# Patient Record
Sex: MALE | Race: WHITE | NOT HISPANIC OR LATINO | Employment: FULL TIME | URBAN - METROPOLITAN AREA
[De-identification: names, ages, dates, MRNs, and addresses within clinical notes are randomized per-mention and may not be internally consistent; named-entity substitution may affect disease eponyms.]

---

## 2020-10-25 ENCOUNTER — OFFICE VISIT (OUTPATIENT)
Dept: URGENT CARE | Facility: CLINIC | Age: 40
End: 2020-10-25
Payer: COMMERCIAL

## 2020-10-25 VITALS
WEIGHT: 270 LBS | SYSTOLIC BLOOD PRESSURE: 160 MMHG | BODY MASS INDEX: 39.99 KG/M2 | HEIGHT: 69 IN | TEMPERATURE: 99 F | DIASTOLIC BLOOD PRESSURE: 100 MMHG | OXYGEN SATURATION: 96 % | HEART RATE: 75 BPM | RESPIRATION RATE: 16 BRPM

## 2020-10-25 DIAGNOSIS — R03.0 ELEVATED BLOOD PRESSURE READING: ICD-10-CM

## 2020-10-25 DIAGNOSIS — L03.032 PARONYCHIA OF GREAT TOE, LEFT: Primary | ICD-10-CM

## 2020-10-25 DIAGNOSIS — L60.0 INGROWN TOENAIL OF BOTH FEET: ICD-10-CM

## 2020-10-25 PROCEDURE — 99203 PR OFFICE/OUTPT VISIT, NEW, LEVL III, 30-44 MIN: ICD-10-PCS | Mod: S$GLB,,, | Performed by: NURSE PRACTITIONER

## 2020-10-25 PROCEDURE — 99203 OFFICE O/P NEW LOW 30 MIN: CPT | Mod: S$GLB,,, | Performed by: NURSE PRACTITIONER

## 2020-10-25 RX ORDER — SULFAMETHOXAZOLE AND TRIMETHOPRIM 800; 160 MG/1; MG/1
1 TABLET ORAL 2 TIMES DAILY
Qty: 20 TABLET | Refills: 0 | Status: SHIPPED | OUTPATIENT
Start: 2020-10-25 | End: 2020-11-04

## 2020-10-25 RX ORDER — MUPIROCIN 20 MG/G
OINTMENT TOPICAL
Qty: 22 G | Refills: 1 | Status: SHIPPED | OUTPATIENT
Start: 2020-10-25

## 2020-10-25 NOTE — PATIENT INSTRUCTIONS
If your condition worsens or fails to improve we recommend that you receive another evaluation at the ER immediately or contact your PCP to discuss your concerns or return here. You must understand that you've received an urgent care treatment only and that you may be released before all your medical problems are known or treated. You the patient will arrange for follouwp care as instructed.  A referral was placed for you, call [phone] to schedule appointment.  Use warm compresses epsom salt soaks for 20 minutes 3-5 times a day. Avoid picking or manipulating the wound. Clean the wound twice a day and put the antibiotic ointment on it. You should seek ER treatment if fever, increase pain, swelling or other signs of increasing infection.   If you are female and on BCP use additional methods to prevent pregnancy while on antibiotics and for one cycle after.   If you were given narcotics do not drive or operate heavy equipment or machinery while taking these medications.   Tylenol or ibuprofen can also be used as directed for pain unless you have an allergy to them or medical condition such as stomach ulcers, kidney or liver disease or blood thinners etc for which you should not be taking these type of medications.     Elevated Blood Pressure in Office Setting    - Your blood pressure was elevated during your visit to the urgent care. It was not so high that immediate care was needed but it is recommended that you monitor your blood pressure over the next week or two to make sure that it is not staying elevated.    - Please have your blood pressure taken 2-3 times daily at different times of the day.  - Write all of those blood pressures down and record the time that they were taken.   - Keep all that information and take it with you to see your Primary Care Physician.   - According to ACEP guidelines, workup and treatment of hypertension in asymptomatic patient is not warranted:  (1) Initiating treatment for  asymptomatic hypertension in the ED is not necessary when patients have follow-up.  (2) Rapidly lowering blood pressure in asymptomatic patients in the ED is unnecessary and may be harmful in some patients  (3) When ED treatment for asymptomatic hypertension is initiated, blood pressure management should attempt to gradually lower blood pressure and should not be expected to be normalized during the initial ED visit.       Ingrown Toenail, Infected (Antibiotics, No Excision)  An ingrown toenail occurs when the nail grows sideways into the skin alongside the nail. This can cause pain. It can also lead to an infection with redness, swelling, and sometimes drainage.  The most common cause of an ingrown toenail is trimming your nails wrong. Most people trim the nails too close to the skin and try to round the nail too tightly around the shape of the toe. When you do this, the nail can grow into the skin of your toe. It is safer to trim the nail ending in a straight line rather than a curve.  Other causes include injury or wearing shoes that are too short or tight. This can cause the same problem that happens when trimming your nails. Your genetics can also make this more likely to happen.  The following are the most common symptoms of an ingrown toenail:   · Pain  · Redness  · Swelling  · Drainage  If the infection is mild, you may be able to take care of it at home with the following measures:  · Frequent warm water soaks  · Keeping it clean  · Wearing loose, comfortable shoes or sandals  Another method involves using a small piece of cotton or waxed dental floss to gently lift up the corner of the problem nail. Change the cotton or floss frequently, especially if it gets dirty.  If your infection is mild, and the above methods arent working, or if the infection gets worse, see your healthcare provider. Signs of worsening infection include:  · Swelling  · Redness  · Pus drainage  In some cases, you may need  antibiotics along with warm soaks. If after 2 to 3 days of antibiotics the toenail doesn't get better or gets worse, part of the nail may need to be removed to drain the infection. With treatment, it can take 1 to 2 weeks to clear up completely.  Home care  Wound care  For the next 3 days, soak and clean your toe in warm water a few times a day.  · Twice a day for the first 3 days, clean and soak the toe as follows:  1. Soak your foot in a tub of warm water for 5 minutes. Or, hold your toe under a faucet of warm running water for 5 minute  2. Clean any remaining crust away with soap and water using a cotton swab.  3. Put a small amount of antibiotic ointment on the infected area.  · Change the dressing or bandage every time you soak or clean it, or whenever it becomes wet or dirty.  · If you were prescribed antibiotics, take them as directed until they are all gone.  · Wear comfortable shoes with a lot of toe room, or open-toe sandals, while your toe is healing.  Medicines  · You can take over-the-counter medicine for pain, unless you were given a different pain medicine to use. Note: Talk with your provider before using these medicines if you have chronic liver or kidney disease, ever had a stomach ulcer or GI (gastrointestinal) bleeding, or are taking blood thinner medicines.  · If you were given antibiotics, take them until they are used up or your provider tells you to stop, even if the wound looks better. This ensures that the infection clears up.  Prevention  To prevent ingrown toenails:  · Wear shoes that fit well. Avoid shoes that pinch the toes together.  · When you trim your toenails, do not cut them too short. Cut straight across at the top and dont round the edges.  · Dont use a sharp object to clean under your nail since this might cause an infection.  · If the toenail starts to grow into the skin again, put a small piece of waxed dental floss or cotton under that side of the nail to help it grow out  straight.  Follow-up care  Follow up with your healthcare provider, or as advised.  When to seek medical advice  Call your healthcare provider right away if any of the following occur:  · Increasing redness, pain, or swelling of the toe  · Red streaks in the skin leading away from the wound  · Pus or fluid drainage  · Fever of 100.4°F (38°C) or higher, or as directed by your provider  Date Last Reviewed: 12/1/2016  © 2639-7946 [employer]. [address] [place] [address]. All rights reserved. This information is not intended as a substitute for professional medical care. Always follow your healthcare professional's instructions.

## 2020-10-25 NOTE — PROGRESS NOTES
"Subjective:       Patient ID: Karthik Power is a 40 y.o. male.    Vitals:  height is 5' 9" (1.753 m) and weight is 122.5 kg (270 lb). His temperature is 98.5 °F (36.9 °C). His blood pressure is 160/100 (abnormal) and his pulse is 75. His respiration is 16 and oxygen saturation is 96%.     Chief Complaint: Ingrown Toenail (Left 1st Toe)    C/o ingrown toenails on both feet.  States that they have been bothering him for a little while but his schedule in Monday through Saturday and would like to get them removed.  A referral to podiatry was placed.  Left great toe is starting to look infected.  Will cover with antibiotics.  Discussed high blood pressure reading in clinic.  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      Ingrown Toenail  This is a new problem. Episode onset: 2 weeks. The problem occurs intermittently. The problem has been gradually worsening. Pertinent negatives include no abdominal pain, anorexia, arthralgias, change in bowel habit, chest pain, chills, congestion, coughing, diaphoresis, fatigue, fever, headaches, joint swelling, myalgias, nausea, neck pain, numbness, rash, sore throat, swollen glands, urinary symptoms, vertigo, visual change, vomiting or weakness. The symptoms are aggravated by walking (Palpation). Treatments tried: soaks. The treatment provided no relief.       Constitution: Negative for chills, sweating, fatigue and fever.   HENT: Negative for congestion and sore throat.    Neck: Negative for neck pain and painful lymph nodes.   Cardiovascular: Negative for chest pain and leg swelling.   Eyes: Negative for double vision and blurred vision.   Respiratory: Negative for cough and shortness of breath.    Gastrointestinal: Negative for abdominal pain, nausea, vomiting and diarrhea.   Genitourinary: Negative for dysuria, frequency and urgency.   Musculoskeletal: Negative for joint pain, joint swelling, muscle cramps and muscle ache. "   Skin: Positive for color change, erythema and abscess. Negative for pale and rash.   Allergic/Immunologic: Negative for seasonal allergies.   Neurological: Negative for dizziness, history of vertigo, light-headedness, passing out, headaches and numbness.   Hematologic/Lymphatic: Negative for swollen lymph nodes, easy bruising/bleeding and history of blood clots. Does not bruise/bleed easily.   Psychiatric/Behavioral: Negative for nervous/anxious, sleep disturbance and depression. The patient is not nervous/anxious.        Objective:      Physical Exam   Constitutional: He is oriented to person, place, and time. He appears well-developed.  Non-toxic appearance. He does not appear ill. No distress.   HENT:   Head: Normocephalic and atraumatic. Head is without abrasion, without contusion and without laceration.   Ears:   Right Ear: External ear normal.   Left Ear: External ear normal.   Nose: Nose normal.   Mouth/Throat: Oropharynx is clear and moist and mucous membranes are normal.   Eyes: Pupils are equal, round, and reactive to light. Conjunctivae, EOM and lids are normal.   Neck: Trachea normal, full passive range of motion without pain and phonation normal. Neck supple.   Cardiovascular: Normal rate, regular rhythm and normal heart sounds.   Pulmonary/Chest: Effort normal and breath sounds normal. No stridor. No respiratory distress.   Musculoskeletal: Normal range of motion.   Neurological: He is alert and oriented to person, place, and time.   Skin: Skin is warm, dry, intact, not diaphoretic, no rash and abscessed (there is an early abscess to lateral nail fold of left great toe with induration and no fluctuance with ingrown toenail.  There is a non infected ingrown toenail to right great toe as well.  ). Capillary refill takes less than 2 seconds. Lesions:  erythemaabrasion, burn, bruising and ecchymosisPsychiatric: His speech is normal and behavior is normal. Judgment and thought content normal.   Nursing  note and vitals reviewed.        Assessment:       1. Paronychia of great toe, left    2. Elevated blood pressure reading    3. Ingrown toenail of both feet        Plan:         Paronychia of great toe, left  -     Ambulatory referral/consult to Podiatry  -     sulfamethoxazole-trimethoprim 800-160mg (BACTRIM DS) 800-160 mg Tab; Take 1 tablet by mouth 2 (two) times daily. for 10 days  Dispense: 20 tablet; Refill: 0  -     mupirocin (BACTROBAN) 2 % ointment; Apply to affected area 3 times daily  Dispense: 22 g; Refill: 1    Elevated blood pressure reading    Ingrown toenail of both feet  -     Ambulatory referral/consult to Podiatry      Patient Instructions     If your condition worsens or fails to improve we recommend that you receive another evaluation at the ER immediately or contact your PCP to discuss your concerns or return here. You must understand that you've received an urgent care treatment only and that you may be released before all your medical problems are known or treated. You the patient will arrange for follouwp care as instructed.  A referral was placed for you, call [phone] to schedule appointment.  Use warm compresses epsom salt soaks for 20 minutes 3-5 times a day. Avoid picking or manipulating the wound. Clean the wound twice a day and put the antibiotic ointment on it. You should seek ER treatment if fever, increase pain, swelling or other signs of increasing infection.   If you are female and on BCP use additional methods to prevent pregnancy while on antibiotics and for one cycle after.   If you were given narcotics do not drive or operate heavy equipment or machinery while taking these medications.   Tylenol or ibuprofen can also be used as directed for pain unless you have an allergy to them or medical condition such as stomach ulcers, kidney or liver disease or blood thinners etc for which you should not be taking these type of medications.     Elevated Blood Pressure in Office  Setting    - Your blood pressure was elevated during your visit to the urgent care. It was not so high that immediate care was needed but it is recommended that you monitor your blood pressure over the next week or two to make sure that it is not staying elevated.    - Please have your blood pressure taken 2-3 times daily at different times of the day.  - Write all of those blood pressures down and record the time that they were taken.   - Keep all that information and take it with you to see your Primary Care Physician.   - According to ACEP guidelines, workup and treatment of hypertension in asymptomatic patient is not warranted:  (1) Initiating treatment for asymptomatic hypertension in the ED is not necessary when patients have follow-up.  (2) Rapidly lowering blood pressure in asymptomatic patients in the ED is unnecessary and may be harmful in some patients  (3) When ED treatment for asymptomatic hypertension is initiated, blood pressure management should attempt to gradually lower blood pressure and should not be expected to be normalized during the initial ED visit.       Ingrown Toenail, Infected (Antibiotics, No Excision)  An ingrown toenail occurs when the nail grows sideways into the skin alongside the nail. This can cause pain. It can also lead to an infection with redness, swelling, and sometimes drainage.  The most common cause of an ingrown toenail is trimming your nails wrong. Most people trim the nails too close to the skin and try to round the nail too tightly around the shape of the toe. When you do this, the nail can grow into the skin of your toe. It is safer to trim the nail ending in a straight line rather than a curve.  Other causes include injury or wearing shoes that are too short or tight. This can cause the same problem that happens when trimming your nails. Your genetics can also make this more likely to happen.  The following are the most common symptoms of an ingrown  toenail:   · Pain  · Redness  · Swelling  · Drainage  If the infection is mild, you may be able to take care of it at home with the following measures:  · Frequent warm water soaks  · Keeping it clean  · Wearing loose, comfortable shoes or sandals  Another method involves using a small piece of cotton or waxed dental floss to gently lift up the corner of the problem nail. Change the cotton or floss frequently, especially if it gets dirty.  If your infection is mild, and the above methods arent working, or if the infection gets worse, see your healthcare provider. Signs of worsening infection include:  · Swelling  · Redness  · Pus drainage  In some cases, you may need antibiotics along with warm soaks. If after 2 to 3 days of antibiotics the toenail doesn't get better or gets worse, part of the nail may need to be removed to drain the infection. With treatment, it can take 1 to 2 weeks to clear up completely.  Home care  Wound care  For the next 3 days, soak and clean your toe in warm water a few times a day.  · Twice a day for the first 3 days, clean and soak the toe as follows:  1. Soak your foot in a tub of warm water for 5 minutes. Or, hold your toe under a faucet of warm running water for 5 minute  2. Clean any remaining crust away with soap and water using a cotton swab.  3. Put a small amount of antibiotic ointment on the infected area.  · Change the dressing or bandage every time you soak or clean it, or whenever it becomes wet or dirty.  · If you were prescribed antibiotics, take them as directed until they are all gone.  · Wear comfortable shoes with a lot of toe room, or open-toe sandals, while your toe is healing.  Medicines  · You can take over-the-counter medicine for pain, unless you were given a different pain medicine to use. Note: Talk with your provider before using these medicines if you have chronic liver or kidney disease, ever had a stomach ulcer or GI (gastrointestinal) bleeding, or are  taking blood thinner medicines.  · If you were given antibiotics, take them until they are used up or your provider tells you to stop, even if the wound looks better. This ensures that the infection clears up.  Prevention  To prevent ingrown toenails:  · Wear shoes that fit well. Avoid shoes that pinch the toes together.  · When you trim your toenails, do not cut them too short. Cut straight across at the top and dont round the edges.  · Dont use a sharp object to clean under your nail since this might cause an infection.  · If the toenail starts to grow into the skin again, put a small piece of waxed dental floss or cotton under that side of the nail to help it grow out straight.  Follow-up care  Follow up with your healthcare provider, or as advised.  When to seek medical advice  Call your healthcare provider right away if any of the following occur:  · Increasing redness, pain, or swelling of the toe  · Red streaks in the skin leading away from the wound  · Pus or fluid drainage  · Fever of 100.4°F (38°C) or higher, or as directed by your provider  Date Last Reviewed: 12/1/2016  © 7692-7409 [employer]. [address] [place] [address], [place] [address]. All rights reserved. This information is not intended as a substitute for professional medical care. Always follow your healthcare professional's instructions.

## 2020-10-26 ENCOUNTER — TELEPHONE (OUTPATIENT)
Dept: URGENT CARE | Facility: CLINIC | Age: 40
End: 2020-10-26

## 2021-12-22 ENCOUNTER — IMMUNIZATION (OUTPATIENT)
Dept: INTERNAL MEDICINE | Facility: CLINIC | Age: 41
End: 2021-12-22
Payer: COMMERCIAL

## 2021-12-22 DIAGNOSIS — Z23 NEED FOR VACCINATION: Primary | ICD-10-CM

## 2021-12-22 PROCEDURE — 0004A COVID-19, MRNA, LNP-S, PF, 30 MCG/0.3 ML DOSE VACCINE: CPT | Mod: PBBFAC | Performed by: INTERNAL MEDICINE

## 2024-07-18 ENCOUNTER — LAB VISIT (OUTPATIENT)
Dept: LAB | Facility: HOSPITAL | Age: 44
End: 2024-07-18
Attending: INTERNAL MEDICINE
Payer: COMMERCIAL

## 2024-07-18 DIAGNOSIS — E11.9 TYPE 2 DIABETES MELLITUS WITHOUT COMPLICATION, WITHOUT LONG-TERM CURRENT USE OF INSULIN: ICD-10-CM

## 2024-07-18 PROBLEM — I10 ESSENTIAL HYPERTENSION: Status: ACTIVE | Noted: 2024-07-18

## 2024-07-18 LAB
ALBUMIN/CREAT UR: 9.4 UG/MG (ref 0–30)
CREAT UR-MCNC: 299 MG/DL (ref 23–375)
MICROALBUMIN UR DL<=1MG/L-MCNC: 28 UG/ML

## 2024-07-18 PROCEDURE — 82570 ASSAY OF URINE CREATININE: CPT | Performed by: INTERNAL MEDICINE

## 2024-12-04 PROBLEM — R06.83 SNORING: Status: ACTIVE | Noted: 2024-12-04

## 2025-06-19 ENCOUNTER — OFFICE VISIT (OUTPATIENT)
Dept: URGENT CARE | Facility: CLINIC | Age: 45
End: 2025-06-19
Payer: COMMERCIAL

## 2025-06-19 VITALS
HEART RATE: 86 BPM | HEIGHT: 69 IN | RESPIRATION RATE: 17 BRPM | BODY MASS INDEX: 39.99 KG/M2 | WEIGHT: 270 LBS | SYSTOLIC BLOOD PRESSURE: 161 MMHG | OXYGEN SATURATION: 97 % | DIASTOLIC BLOOD PRESSURE: 112 MMHG

## 2025-06-19 DIAGNOSIS — S46.819A STRAIN OF DELTOID MUSCLE, INITIAL ENCOUNTER: ICD-10-CM

## 2025-06-19 DIAGNOSIS — Z02.6 ENCOUNTER RELATED TO WORKER'S COMPENSATION CLAIM: ICD-10-CM

## 2025-06-19 DIAGNOSIS — S46.012A ROTATOR CUFF STRAIN, LEFT, INITIAL ENCOUNTER: Primary | ICD-10-CM

## 2025-06-19 NOTE — LETTER
[facility] [place] [address]  Phone: [phone]  Fax: [phone]  [facility]: [phone]    Pt Name: Karthik Power  Injury Date: 06/18/2025   Employee ID: [id number] Date of First Treatment: 06/19/2025   Company: [employer]      Appointment Time: 12:10 PM Arrived: 12:14 PM   Provider: Zeynep Niño MD Time Out:1:40 PM     Office Treatment:   1. Rotator cuff strain, left, initial encounter    2. Strain of deltoid muscle, initial encounter    3. Encounter related to worker's compensation claim          Patient Instructions: Attention not to aggravate affected area (Take OTC ibuprofen every 6-8hours as needed for pain)        Restrictions: No lifting/pushing/pulling more than 10 lbs, No above the shoulder/overhead work     Return Appointment: 6/26/2025 at 10:00 AM    KW

## 2025-06-19 NOTE — PROGRESS NOTES
Subjective:      Patient ID: Karthik Power is a 45 y.o. male.    Chief Complaint: Shoulder Injury    Patient's place of employment - [employer]  Patient's job title -   Date of injury - 06/18/2025  Body part injured including left or right - bilateral shoulder/arms  Injury Mechanism - pulling packages  What they were doing when they got hurt - Pt states that he was pulling a tote with packages to the front of his work van.   What they did immediately after - Stopped and called supervisor  Pain scale right now - 3/10 right now 7/10 with certain positions.    KW    See MA note above. Begin MD note:  Karthik Howard [facility] for his 1st evaluation of bilateral shoulder pain that began yesterday while pulling a tote in his work van.  He says the toe was at approximately shoulder height and weight approximately 50 lbs, which is in the normal range of packages that he lifts and carries every day.  He says as he was pulling this particular tote toward him with both arms, he felt a popping and grinding sensation in both shoulders. Th pain gradually increased throughout the day as he continue working.. Reported to supervisor and went home early.  Upon arriving home, he states he did not try any oral or topical medications for his pain.  He took a shower and tried to get some sleep.  However, he notes that he did continued to have pain with movements while sleeping.  He denies pain prior to going to work yesterday.  Denies prior injuries or surgeries to either upper extremity.  Pain overall unchanged from yesterday.  Denies numbness, tingling, radiation of pain, bruising, swelling, or redness.  No other reported injuries, complaints, or concerns at this time.      Musculoskeletal:  Positive for pain and abnormal ROM of joint.   Skin:  Negative for erythema.     Objective:     Physical Exam  Vitals and nursing note reviewed.   Constitutional:       General:  He is not in acute distress.     Appearance: Normal appearance. He is not ill-appearing.   HENT:      Head: Normocephalic.   Eyes:      Conjunctiva/sclera: Conjunctivae normal.   Pulmonary:      Effort: No respiratory distress.   Musculoskeletal:      Comments: Right shoulder:  Tender with palpation lateral deltoid muscle proximally and over AC joint.  No other reported tenderness.  Negative empty can testing, negative speed's negative belly press test.  Abduction to 160°, forward flexion to 160°, normal internal and external rotation.      Left shoulder:  Tender with palpation of posterior lateral shoulder proximally over humeral head.  This is also the site where pain localizes with provocative maneuvers and range of motion testing.  Positive empty can testing, negative Speed's test, Positive belly press test.  Forward flexion to 150°, abduction to 120°, increased pain noted with internal rotation greater than with external rotation   Skin:     General: Skin is warm and dry.      Findings: No erythema.   Neurological:      Mental Status: He is alert and oriented to person, place, and time.      GCS: GCS eye subscore is 4. GCS verbal subscore is 5. GCS motor subscore is 6.   Psychiatric:         Attention and Perception: Attention normal.         Mood and Affect: Mood normal.         Behavior: Behavior normal.      Assessment:      1. Rotator cuff strain, left, initial encounter    2. Strain of deltoid muscle, initial encounter    3. Encounter related to worker's compensation claim      Plan:     Patient presents with symptoms of a mild deltoid strain on the right and suspected rotator cuff strain on the left.  Reviewed relative lack of indication for imaging at this time with the patient as I have little to no suspicion of any acute bony abnormalities.  Patient verbalized agreement and understood that imaging may be ordered in the future if indicated.  Discussed pathogenesis of his diagnoses and recommended  supportive care.  Advised use of OTC ibuprofen 600-800 mg every 6-8 hours as needed for pain, respectively, gentle stretching with handout provided, and 1 week of limitations at work.  Indications for sooner follow-up discussed.  All patient questions and concerns addressed prior to completion of the visit.       Patient Instructions: Attention not to aggravate affected area (Take OTC ibuprofen every 6-8hours as needed for pain)   Restrictions: No lifting/pushing/pulling more than 10 lbs, No above the shoulder/overhead work  Follow up in about 1 week (around 6/26/2025).    I spent a total of 52 minutes on the day of the visit.   This includes face to face time and non-face to face time preparing to see the patient (eg, review of tests, prior records/notes), obtaining and/or reviewing separately obtained history, documenting clinical information in the electronic or other health record.

## 2025-07-01 ENCOUNTER — TELEPHONE (OUTPATIENT)
Dept: URGENT CARE | Facility: CLINIC | Age: 45
End: 2025-07-01
Payer: COMMERCIAL

## 2025-07-01 NOTE — TELEPHONE ENCOUNTER
Called the patient regarding their missed Occupational Health appointment. There was no answer, so a voice message was left stating the reason for the call. -YESENIA

## 2025-07-10 ENCOUNTER — TELEPHONE (OUTPATIENT)
Dept: URGENT CARE | Facility: CLINIC | Age: 45
End: 2025-07-10
Payer: COMMERCIAL

## 2025-07-10 NOTE — TELEPHONE ENCOUNTER
Second Attempt: Called the patient regarding their missed Occupational Health appointment and the patient answered, asked the patient could he verify his second identifier and he hung up the phone. I called the patient back to try and get him rescheduled from that missed Occupational Health appointment from 6/26/2025. Pt didn't  answer the phone. AFG